# Patient Record
Sex: FEMALE | Race: WHITE
[De-identification: names, ages, dates, MRNs, and addresses within clinical notes are randomized per-mention and may not be internally consistent; named-entity substitution may affect disease eponyms.]

---

## 2017-01-09 ENCOUNTER — HOSPITAL ENCOUNTER (OUTPATIENT)
Dept: HOSPITAL 45 - C.LABBFT | Age: 67
Discharge: HOME | End: 2017-01-09
Attending: INTERNAL MEDICINE
Payer: COMMERCIAL

## 2017-01-09 DIAGNOSIS — R73.09: Primary | ICD-10-CM

## 2017-01-09 DIAGNOSIS — E78.5: ICD-10-CM

## 2017-01-09 LAB
APPEARANCE UR: CLEAR
BILIRUB UR-MCNC: (no result) MG/DL
COLOR UR: YELLOW
EST. AVERAGE GLUCOSE BLD GHB EST-MCNC: 120 MG/DL
MANUAL MICROSCOPIC REQUIRED?: NO
NITRITE UR QL STRIP: (no result)
PH UR STRIP: 5 [PH] (ref 4.5–7.5)
REVIEW REQ?: NO
SP GR UR STRIP: 1.02 (ref 1–1.03)
TSH SERPL-ACNC: 0.68 UIU/ML (ref 0.3–4.5)
URINE EPITHELIAL CELL AUTO: (no result) /LPF (ref 0–5)
UROBILINOGEN UR-MCNC: (no result) MG/DL
ZZUR CULT IF INDIC CLEAN CATCH: YES

## 2017-01-19 ENCOUNTER — HOSPITAL ENCOUNTER (OUTPATIENT)
Dept: HOSPITAL 45 - C.ULTR | Age: 67
Discharge: HOME | End: 2017-01-19
Attending: INTERNAL MEDICINE
Payer: COMMERCIAL

## 2017-01-19 DIAGNOSIS — E04.2: Primary | ICD-10-CM

## 2017-01-19 NOTE — DIAGNOSTIC IMAGING REPORT
THYROID ULTRASONOGRAPHY



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    



COMPARISON STUDY:  3/25/2014



FINDINGS: The right lobe of the thyroid measures 53 x 21 x 23 mm. 7 right lobe

thyroid nodules are visualized. The largest measures 13 mm in long axis. There

is also a stable calcified nodule within the midpole of the right lobe measuring

11 mm.



The left lobe measures 50 x 20 x 21 mm. 5 left-sided nodules are visualized. The

largest measures 12 mm in diameter.



There is a 13 mm probably cystic isthmus nodule.



IMPRESSION:  Multinodular thyroid goiter similar to the prior March 2014 study 









Electronically signed by:  Dank Wyatt M.D.

1/19/2017 7:52 AM



Dictated Date/Time:  1/19/2017 7:49 AM

## 2017-07-10 ENCOUNTER — HOSPITAL ENCOUNTER (OUTPATIENT)
Dept: HOSPITAL 45 - C.LABBFT | Age: 67
Discharge: HOME | End: 2017-07-10
Attending: INTERNAL MEDICINE
Payer: COMMERCIAL

## 2017-07-10 DIAGNOSIS — E78.5: Primary | ICD-10-CM

## 2017-07-10 DIAGNOSIS — R73.03: ICD-10-CM

## 2017-07-10 LAB
ALBUMIN/GLOB SERPL: 1.2 {RATIO} (ref 0.9–2)
ALP SERPL-CCNC: 61 U/L (ref 45–117)
ALT SERPL-CCNC: 37 U/L (ref 12–78)
ANION GAP SERPL CALC-SCNC: 7 MMOL/L (ref 3–11)
AST SERPL-CCNC: 23 U/L (ref 15–37)
BASOPHILS # BLD: 0.05 K/UL (ref 0–0.2)
BASOPHILS NFR BLD: 0.7 %
BUN SERPL-MCNC: 18 MG/DL (ref 7–18)
BUN/CREAT SERPL: 24.8 (ref 10–20)
CALCIUM SERPL-MCNC: 9.3 MG/DL (ref 8.5–10.1)
CHLORIDE SERPL-SCNC: 108 MMOL/L (ref 98–107)
CHOLEST/HDLC SERPL: 3.2 {RATIO}
CO2 SERPL-SCNC: 28 MMOL/L (ref 21–32)
COMPLETE: YES
CREAT SERPL-MCNC: 0.71 MG/DL (ref 0.6–1.2)
EOSINOPHIL NFR BLD AUTO: 352 K/UL (ref 130–400)
EST. AVERAGE GLUCOSE BLD GHB EST-MCNC: 114 MG/DL
GLOBULIN SER-MCNC: 3.2 GM/DL (ref 2.5–4)
GLUCOSE SERPL-MCNC: 89 MG/DL (ref 70–99)
GLUCOSE UR QL: 38 MG/DL
HCT VFR BLD CALC: 41.5 % (ref 37–47)
IG%: 0.4 %
IMM GRANULOCYTES NFR BLD AUTO: 24.8 %
KETONES UR QL STRIP: 53 MG/DL
LYMPHOCYTES # BLD: 1.9 K/UL (ref 1.2–3.4)
MCH RBC QN AUTO: 29.8 PG (ref 25–34)
MCHC RBC AUTO-ENTMCNC: 33 G/DL (ref 32–36)
MCV RBC AUTO: 90.2 FL (ref 80–100)
MONOCYTES NFR BLD: 5.5 %
NEUTROPHILS # BLD AUTO: 3.7 %
NEUTROPHILS NFR BLD AUTO: 64.9 %
NITRITE UR QL STRIP: 148 MG/DL (ref 0–150)
PH UR: 121 MG/DL (ref 0–200)
PMV BLD AUTO: 10.5 FL (ref 7.4–10.4)
POTASSIUM SERPL-SCNC: 4.8 MMOL/L (ref 3.5–5.1)
RBC # BLD AUTO: 4.6 M/UL (ref 4.2–5.4)
SODIUM SERPL-SCNC: 143 MMOL/L (ref 136–145)
VERY LOW DENSITY LIPOPROT CALC: 30 MG/DL
WBC # BLD AUTO: 7.66 K/UL (ref 4.8–10.8)

## 2017-07-14 ENCOUNTER — HOSPITAL ENCOUNTER (OUTPATIENT)
Dept: HOSPITAL 45 - C.MAMM | Age: 67
Discharge: HOME | End: 2017-07-14
Attending: OBSTETRICS & GYNECOLOGY
Payer: COMMERCIAL

## 2017-07-14 DIAGNOSIS — Z12.31: Primary | ICD-10-CM

## 2017-07-14 NOTE — MAMMOGRAPHY REPORT
BILATERAL DIGITAL SCREENING MAMMOGRAM WITH CAD: 7/14/2017

CLINICAL HISTORY: Routine screening.  





TECHNIQUE:  Current study was also evaluated with a Computer Aided Detection (CAD) system.  Bilateral
 CC and MLO views were obtained.



COMPARISON: Comparison is made to exams dated:  7/2/2015 mammogram, 7/7/2016 mammogram, 7/1/2014 mamm
ogram, 6/27/2013 mammogram, 6/19/2012 mammogram, and 6/14/2011 mammogram - [facility]
er.   



BREAST COMPOSITION:  There are scattered areas of fibroglandular density in both breasts.  



FINDINGS:  No suspicious masses, calcifications, or areas of architectural distortion are noted in ei
ther breast. There has been no significant interval change compared to prior exams.  Scattered bilate
ral benign-appearing calcifications are not significant changed.  Bilateral asymmetries are stable, i
ncluding a nodular asymmetry in the right medial breast on the cc view which is stable dating back to
 at least the 2015 exam.





IMPRESSION:  ACR BI-RADS CATEGORY 1: NEGATIVE

There is no mammographic evidence of malignancy. A 1 year screening mammogram is recommended.  The pa
tient will receive written notification of the results.  





Approximately 10% of breast cancers are not detected with mammography. A negative mammographic report
 should not delay biopsy if a clinically suggestive mass is present.



Antoinette Boogie M.D.          

/:7/14/2017 08:16:52  



Imaging Technologist: Quirino ANDINO)(MAGGIE), [facility]

letter sent: Normal 1/2  

BI-RADS Code: ACR BI-RADS Category 1: Negative

## 2017-08-15 ENCOUNTER — HOSPITAL ENCOUNTER (OUTPATIENT)
Dept: HOSPITAL 45 - C.PAPS | Age: 67
Discharge: HOME | End: 2017-08-15
Attending: OBSTETRICS & GYNECOLOGY
Payer: COMMERCIAL

## 2017-08-15 DIAGNOSIS — Z12.4: Primary | ICD-10-CM

## 2017-09-12 ENCOUNTER — HOSPITAL ENCOUNTER (OUTPATIENT)
Dept: HOSPITAL 45 - C.GI | Age: 67
Discharge: HOME | End: 2017-09-12
Attending: INTERNAL MEDICINE
Payer: COMMERCIAL

## 2017-09-12 VITALS
WEIGHT: 159.33 LBS | BODY MASS INDEX: 28.23 KG/M2 | HEIGHT: 62.99 IN | BODY MASS INDEX: 28.23 KG/M2 | WEIGHT: 159.33 LBS | HEIGHT: 62.99 IN

## 2017-09-12 VITALS — TEMPERATURE: 97.7 F

## 2017-09-12 VITALS — OXYGEN SATURATION: 98 % | SYSTOLIC BLOOD PRESSURE: 115 MMHG | HEART RATE: 56 BPM | DIASTOLIC BLOOD PRESSURE: 90 MMHG

## 2017-09-12 DIAGNOSIS — K57.30: ICD-10-CM

## 2017-09-12 DIAGNOSIS — Z80.0: ICD-10-CM

## 2017-09-12 DIAGNOSIS — K64.8: ICD-10-CM

## 2017-09-12 DIAGNOSIS — Z12.11: Primary | ICD-10-CM

## 2017-09-12 DIAGNOSIS — Z79.899: ICD-10-CM

## 2017-09-12 NOTE — ANESTHESIOLOGY PROGRESS NOTE
Anesthesia Post Op Note


Date & Time


Sep 12, 2017 at 10:47





Vital Signs


Pain Intensity:  0





Vital Signs Past 12 Hours








  Date Time  Temp Pulse Resp B/P (MAP) Pulse Ox O2 Delivery O2 Flow Rate FiO2


 


9/12/17 09:23  56 16 115/90 (98) 98 Room Air  


 


9/12/17 09:08  57 16 112/65 (81) 99 Nasal Cannula 2 


 


9/12/17 08:53  59 16 93/50 (64) 99 Nasal Cannula 4 


 


9/12/17 08:07 36.5 69 20 150/63 (92) 97 Room Air  











Notes


Mental Status:  alert / awake / arousable, participated in evaluation


Pt Amnestic to Procedure:  Yes


Nausea / Vomiting:  adequately controlled


Pain:  adequately controlled


Airway Patency, RR, SpO2:  stable & adequate


BP & HR:  stable & adequate


Hydration State:  stable & adequate


Anesthetic Complications:  no major complications apparent

## 2017-09-12 NOTE — ENDO HISTORY AND PHYSICAL
History & Physical


Date of Service:


Sep 12, 2017.


Chief Complaint:


screening


Referring Physician:


Dr. Edison Rivera


History of Present Illness


68 yo CF who presents for screening colonoscopy.





Past Surgical History


Hx Cardiac Surgery:  No


Hx Internal Defibrillator:  No


Hx Pacemaker:  No


Hx Abdominal Surgery:  No


Hx of Implantable Prosthesis:  No


Hx Post-Op Nausea and Vomiting:  No


Hx Cancer Surgery:  No


Hx Thoracic Surgery:  No


Hx Orthopedic:  No


Hx Urinary Tract Surgery:  No





Family History


Colon CA





Social History


Smoking Status:  Never Smoker


Hx Substance Use:  No


Hx Alcohol Use:  No





Allergies


Coded Allergies:  


     Acetaminophen (Verified  Allergy, Unknown, NAUSEA, 8/29/17)


     Hydrocodone (Verified  Allergy, Unknown, NAUSEA, 8/29/17)


     Pravastatin (Verified  Allergy, Unknown, LEG CRAMPING, 8/29/17)





Current Medications





Reported Home Medications








 Medications  Dose


 Route/Sig


 Max Daily Dose Days Date Category


 


 Amoxil


  (Amoxicillin) 500


 Mg Cap  4 Cap


 PO UD PRN


    8/29/17 Reported


 


 Green Tea (Misc


 Natural Products)


 1 Tab Tab  1 Tab


 PO QAM


    8/29/17 Reported


 


 Lipitor


  (Atorvastatin


 Calcium) 20 Mg Tab  20 Mg


 PO QPM


    8/29/17 Reported


 


 Lopressor


  (Metoprolol


 Tartrate) 25 Mg


 Tab  0.5 Tab


 PO QPM


    8/29/17 Reported


 


 Prinivil


  (Lisinopril) 20


 Mg Tab  20 Mg


 PO QPM


    8/29/17 Reported


 


 Multivitamin


  (Multivitamins)


 Tab  1 Tab


 PO QAM


    8/1/09 Reported


 


 Ecotrin Or


 Generic *


  (Aspirin) 81 Mg


 Ectab  81 Mg


 PO QAM


    8/1/09 Reported











Vital Signs


Weight (Kilograms):  72.27


Height (Feet):  5


Height (Inches):  3











  Date Time  Temp Pulse Resp B/P (MAP) Pulse Ox O2 Delivery O2 Flow Rate FiO2


 


9/12/17 08:07 36.5 69 20 150/63 (92) 97 Room Air  











Physical Exam


General Appearance:  WD/WN, no apparent distress


Respiratory/Chest:  


   Auscultation:  breath sounds normal


Cardiovascular:  


   Heart Auscultation:  RRR


Abdomen:  


   Bowel Sounds:  normal


   Inspection & Palpation:  soft, non-distended, no tenderness, guarding & 

rebound





Assessment and Plan


Assessment:


68 yo CF who presents for screening colonoscopy.








Plan:


Proceed with colonoscopy.

## 2017-09-12 NOTE — GI REPORT
Procedure Date: 9/12/2017 8:35 AM

Procedure:            Colonoscopy

Indications:          Screening for colorectal malignant neoplasm

Medicines:            Monitored Anesthesia Care

Complications:        No immediate complications.

Estimated Blood Loss: Estimated blood loss: none.

Procedure:            Pre-Anesthesia Assessment:

                      - Prior to the procedure, a History and Physical was 

                      performed, and patient medications and allergies were 

                      reviewed. The patient's tolerance of previous 

                      anesthesia was also reviewed. The risks and benefits of 

                      the procedure and the sedation options and risks were 

                      discussed with the patient. All questions were 

                      answered, and informed consent was obtained. Prior 

                      Anticoagulants: The patient has taken no previous 

                      anticoagulant or antiplatelet agents. ASA Grade 

                      Assessment: II - A patient with mild systemic disease. 

                      After reviewing the risks and benefits, the patient was 

                      deemed in satisfactory condition to undergo the 

                      procedure.

                      After I obtained informed consent, the scope was passed 

                      under direct vision. Throughout the procedure, the 

                      patient's blood pressure, pulse, and oxygen saturations 

                      were monitored continuously. The scope was introduced 

                      through the anus and advanced to the terminal ileum. 

                      The colonoscopy was performed without difficulty. The 

                      patient tolerated the procedure well. The quality of 

                      the bowel preparation was good. The terminal ileum, 

                      ileocecal valve, appendiceal orifice, and rectum were 

                      photographed.

Findings:

     Scattered small-mouthed diverticula were found in the entire colon.

     Non-bleeding internal hemorrhoids were found during retroflexion. The 

     hemorrhoids were small.

Impression:           - Diverticulosis in the entire examined colon.

                      - Non-bleeding internal hemorrhoids.

                      - No specimens collected.

Recommendation:       - Resume previous diet.

                      - Continue present medications.

                      - Repeat colonoscopy in 10 years for surveillance.

                      - Return to primary care physician as previously 

                      scheduled.

Miguel Ángel Varma, DO

9/12/2017 8:53:50 AM

This report has been signed electronically.

Note Initiated On: 9/12/2017 8:35 AM

     I attest to the content of the Intraoperative Record and orders 

     documented therein, exceptions below

## 2017-09-12 NOTE — DISCHARGE INSTRUCTIONS
Endoscopy Patient Instructions


Date / Procedure(s) Performed


Sep 12, 2017.


Colonoscopy





Allergy Information


Coded Allergies:  


     Acetaminophen (Verified  Allergy, Unknown, NAUSEA, 8/29/17)


     Hydrocodone (Verified  Allergy, Unknown, NAUSEA, 8/29/17)


     Pravastatin (Verified  Allergy, Unknown, LEG CRAMPING, 8/29/17)





Discharge Date / Findings


Sep 12, 2017.


Diverticulosis


Internal hemorrhoids





Medication Instructions


Stopped Medication(s):  


took Baby ASA yesterday


OK to resume all medications today as prescribed





Reported Home Medications








 Medications  Dose


 Route/Sig


 Max Daily Dose Days Date Category


 


 Amoxil


  (Amoxicillin) 500


 Mg Cap  4 Cap


 PO UD PRN


    8/29/17 Reported


 


 Green Tea (Misc


 Natural Products)


 1 Tab Tab  1 Tab


 PO QAM


    8/29/17 Reported


 


 Lipitor


  (Atorvastatin


 Calcium) 20 Mg Tab  20 Mg


 PO QPM


    8/29/17 Reported


 


 Lopressor


  (Metoprolol


 Tartrate) 25 Mg


 Tab  0.5 Tab


 PO QPM


    8/29/17 Reported


 


 Prinivil


  (Lisinopril) 20


 Mg Tab  20 Mg


 PO QPM


    8/29/17 Reported


 


 Multivitamin


  (Multivitamins)


 Tab  1 Tab


 PO QAM


    8/1/09 Reported


 


 Ecotrin Or


 Generic *


  (Aspirin) 81 Mg


 Ectab  81 Mg


 PO QAM


    8/1/09 Reported











Provider Instructions





Activity Restrictions





-  No exercising or heavy lifting for 24 hours. 


-  Do not drink alcohol the day of the procedure.


-  Do not drive a car or operate machinery until the day after the procedure.


-  Do not make any important decisions or sign important papers in 24 hours 

after the procedure.





Following Day:





-  Return to full activity which may include returning to work/school.





Diet





Start your diet with liquids and light foods (jello, soup, juice, toast).  Then 

eat your usual diet if not nauseated.





Treatment For Common After Affects





For mild abdominal pain, bloating, or excessive gas:





-  Rest


-  Eat lightly


-  Lie on right side





Follow-Up Information


Follow-up with Dr. Edison Rivera as scheduled





Anesthesia Information





What You Should Know





You have had a procedure that required some medicine to reduce anxiety and 

discomfort. This treatment is called moderate sedation.  


After receiving the treatment, you may be sleepy, but you will be able to 

breathe on your own.  The effects of the treatment may last for several hours.








Follow these instructions along with Activity/Diet recommendations noted above:





*  Do NOT do anything where dizziness or clumsiness would be dangerous.





*  Rest quietly at home today, then you can be up and about tomorrow.





*  Have a responsible person stay with you the rest of today.





*  You may have had an I.V. today.  If so, you may take the dressing off later 

today.





Recommendations


 


Call your doctor if:





*  Trouble breathing 





*  Continuous vomiting for more than 24 hours








*  Temperature above 101 degrees





*  Severe abdominal pain or bloating





*  Pain not relieved by pain medicine ordered





*  There is increased drainage or redness from any incision





*  A large amount of rectal bleeding greater than 2-3 tablespoons. 


   (If you had a polyp/s removed or have hemorrhoids, a small amount of blood -


    from the rectum is to be expected.)





*  You have any unanswered questions or concerns.








IN THE EVENT OF A SERIOUS EMERGENCY, GO TO THE NEAREST EMERGENCY ROOM








       Your discharge instructions were prepared by provider Miguel Ángel Varma.





 Patient Instructions Signature Page














Gena Salguero 











Patient (or Guardian) Signature/Date:____________________________________ I 

have read and understand the instructions given to me by my caregivers.








Caregiver/RN/Doctor Signature/Date:____________________________________











The above-named patient and/or guardian has received patient instructions on 

this date.





























+  Original Patient Signature Page (only) stays with chart.  Please make copy 

for patient.

## 2018-01-16 ENCOUNTER — HOSPITAL ENCOUNTER (OUTPATIENT)
Dept: HOSPITAL 45 - C.LABBFT | Age: 68
Discharge: HOME | End: 2018-01-16
Attending: INTERNAL MEDICINE
Payer: COMMERCIAL

## 2018-01-16 DIAGNOSIS — I10: Primary | ICD-10-CM

## 2018-07-19 ENCOUNTER — HOSPITAL ENCOUNTER (OUTPATIENT)
Dept: HOSPITAL 45 - C.MAMM | Age: 68
Discharge: HOME | End: 2018-07-19
Attending: OBSTETRICS & GYNECOLOGY
Payer: COMMERCIAL

## 2018-07-19 DIAGNOSIS — Z12.31: Primary | ICD-10-CM

## 2018-07-19 NOTE — MAMMOGRAPHY REPORT
BILATERAL DIGITAL SCREENING MAMMOGRAM TOMOSYNTHESIS WITH CAD: 7/19/2018

CLINICAL HISTORY: Routine screening. Patient has no complaints.  





TECHNIQUE: The study was acquired using full field digital technology and interpreted from soft copy.
 Breast tomosynthesis in addition to standard 2D mammography was performed. Current study was also ev
aluated with a Computer Aided Detection (CAD) system.  



COMPARISON: Comparison is made to exams dated:  7/14/2017 mammogram, 7/7/2016 mammogram, 7/2/2015 leonardo
mogram, 7/1/2014 mammogram, 6/27/2013 mammogram, and 6/19/2012 mammogram - [facility]
er.   

BREAST COMPOSITION: There are scattered areas of fibroglandular density in both breasts.  



FINDINGS: 

No suspicious masses, calcifications, or areas of architectural distortion are noted in either breast
. There has been no significant interval change compared to prior exams.  Bilateral benign-appearing 
masses/asymmetries and scattered benign-appearing calcifications are stable compared to prior exams.







IMPRESSION: 

There is no mammographic evidence of malignancy. A 1 year screening mammogram is recommended.(07/20/2
019)  The patient will receive written notification of the results.  





Some breast cancers are not detected with mammography. A negative mammographic report should not monty
y biopsy if a clinically suggestive mass is present.



Antoinette Boogie M.D.          

/:7/19/2018 14:16:04  



Imaging Technologist: Fe Flores, [facility]

letter sent: Normal 1/2  

BI-RADS Code: ACR BI-RADS Category 2: Benign

## 2018-07-30 ENCOUNTER — HOSPITAL ENCOUNTER (OUTPATIENT)
Dept: HOSPITAL 45 - C.LABBFT | Age: 68
Discharge: HOME | End: 2018-07-30
Attending: INTERNAL MEDICINE
Payer: COMMERCIAL

## 2018-07-30 DIAGNOSIS — R73.03: Primary | ICD-10-CM

## 2018-07-30 DIAGNOSIS — E78.5: ICD-10-CM

## 2018-07-30 LAB
ALBUMIN SERPL-MCNC: 3.7 GM/DL (ref 3.4–5)
ALP SERPL-CCNC: 53 U/L (ref 45–117)
ALT SERPL-CCNC: 35 U/L (ref 12–78)
AST SERPL-CCNC: 23 U/L (ref 15–37)
BASOPHILS # BLD: 0.06 K/UL (ref 0–0.2)
BASOPHILS NFR BLD: 0.9 %
BUN SERPL-MCNC: 23 MG/DL (ref 7–18)
CALCIUM SERPL-MCNC: 8.6 MG/DL (ref 8.5–10.1)
CO2 SERPL-SCNC: 23 MMOL/L (ref 21–32)
CREAT SERPL-MCNC: 0.79 MG/DL (ref 0.6–1.2)
EOS ABS #: 0.19 K/UL (ref 0–0.5)
EOSINOPHIL NFR BLD AUTO: 322 K/UL (ref 130–400)
GLUCOSE SERPL-MCNC: 90 MG/DL (ref 70–99)
HBA1C MFR BLD: 6 % (ref 4.5–5.6)
HCT VFR BLD CALC: 40.2 % (ref 37–47)
HGB BLD-MCNC: 13.2 G/DL (ref 12–16)
IG#: 0.01 K/UL (ref 0–0.02)
IMM GRANULOCYTES NFR BLD AUTO: 32.6 %
KETONES UR QL STRIP: 35 MG/DL
LYMPHOCYTES # BLD: 2.14 K/UL (ref 1.2–3.4)
MCH RBC QN AUTO: 29.5 PG (ref 25–34)
MCHC RBC AUTO-ENTMCNC: 32.8 G/DL (ref 32–36)
MCV RBC AUTO: 89.9 FL (ref 80–100)
MONO ABS #: 0.36 K/UL (ref 0.11–0.59)
MONOCYTES NFR BLD: 5.5 %
NEUT ABS #: 3.81 K/UL (ref 1.4–6.5)
NEUTROPHILS # BLD AUTO: 2.9 %
NEUTROPHILS NFR BLD AUTO: 57.9 %
PH UR: 90 MG/DL (ref 0–200)
PMV BLD AUTO: 10.9 FL (ref 7.4–10.4)
POTASSIUM SERPL-SCNC: 4.2 MMOL/L (ref 3.5–5.1)
PROT SERPL-MCNC: 7 GM/DL (ref 6.4–8.2)
RED CELL DISTRIBUTION WIDTH CV: 13.7 % (ref 11.5–14.5)
RED CELL DISTRIBUTION WIDTH SD: 45.3 FL (ref 36.4–46.3)
SODIUM SERPL-SCNC: 142 MMOL/L (ref 136–145)
WBC # BLD AUTO: 6.57 K/UL (ref 4.8–10.8)